# Patient Record
Sex: MALE | Race: WHITE | NOT HISPANIC OR LATINO | Employment: FULL TIME | URBAN - METROPOLITAN AREA
[De-identification: names, ages, dates, MRNs, and addresses within clinical notes are randomized per-mention and may not be internally consistent; named-entity substitution may affect disease eponyms.]

---

## 2025-02-17 ENCOUNTER — APPOINTMENT (OUTPATIENT)
Dept: UROLOGY | Facility: CLINIC | Age: 30
End: 2025-02-17
Payer: COMMERCIAL

## 2025-02-28 ENCOUNTER — TELEPHONE (OUTPATIENT)
Dept: UROLOGY | Facility: CLINIC | Age: 30
End: 2025-02-28
Payer: COMMERCIAL

## 2025-02-28 NOTE — TELEPHONE ENCOUNTER
"Pre-Consult Phone Call    Verified patient by name and date of birth.    Confirmed consult needed for urological reconstruction.    Medicaid/Medicare? no    Sterilization Complete? no    Patient interested in Vaginoplasty     Information provided Letters of support and Surgical procedure brochure     HRT >1 year 2020     Nicotine Never    DM2 Never     Height 5' 8\"    Weight approx 200 lbs    BMI approx 30    Hair Removal None    Ensured patient is aware that letters of support are valid for one year and that we cannot schedule their surgery until we have received copies of both letters.    Pt has one letter. Will ask for another provider. Offered to schedule with [facility].    Encouraged patient to become familiar with their insurance policy, coverage and financial responsibility.    Addressed patient's questions.    Encouraged patient to contact the team with any other questions or concerns.  "

## 2025-03-03 ENCOUNTER — APPOINTMENT (OUTPATIENT)
Dept: UROLOGY | Facility: CLINIC | Age: 30
End: 2025-03-03
Payer: COMMERCIAL

## 2025-03-10 ENCOUNTER — APPOINTMENT (OUTPATIENT)
Dept: UROLOGY | Facility: CLINIC | Age: 30
End: 2025-03-10
Payer: COMMERCIAL

## 2025-03-10 VITALS — SYSTOLIC BLOOD PRESSURE: 118 MMHG | DIASTOLIC BLOOD PRESSURE: 80 MMHG | WEIGHT: 207 LBS

## 2025-03-10 DIAGNOSIS — F64.9 GENDER DYSPHORIA: Primary | ICD-10-CM

## 2025-03-10 PROCEDURE — 99203 OFFICE O/P NEW LOW 30 MIN: CPT | Performed by: UROLOGY

## 2025-03-10 RX ORDER — SYRINGE, DISPOSABLE, 1 ML
SYRINGE, EMPTY DISPOSABLE MISCELLANEOUS
COMMUNITY
Start: 2025-02-28

## 2025-03-10 RX ORDER — ESTRADIOL VALERATE 40 MG/ML
INJECTION INTRAMUSCULAR
COMMUNITY

## 2025-03-10 RX ORDER — NEEDLES, SAFETY 23GX1"
NEEDLE, DISPOSABLE MISCELLANEOUS
COMMUNITY
Start: 2025-02-28

## 2025-03-10 RX ORDER — NEEDLES, DISPOSABLE 25GX5/8"
NEEDLE, DISPOSABLE MISCELLANEOUS
COMMUNITY
Start: 2024-12-16

## 2025-03-10 RX ORDER — PROGESTERONE 200 MG/1
200 CAPSULE ORAL DAILY
COMMUNITY

## 2025-03-10 RX ORDER — NEEDLES, DISPOSABLE 18GX1 1/2"
NEEDLE, DISPOSABLE MISCELLANEOUS
COMMUNITY
Start: 2025-02-28

## 2025-03-10 NOTE — PROGRESS NOTES
GENDER CARE INITIAL EVALUATION    PROBLEM LIST:  1. Gender dysphoria     HISTORY OF PRESENT ILLNESS:  MING PADILLA Hughes is a 29 y.o. trans woman who is seen for discussion of gender-affirming vaginoplasty.  Pronouns are she/her.  Occupation:  at [employer]  Age of earliest memory of gender incongruence: 6-7  Age of onset of dysphoria: 20s  Body part that causes the most dysphoria: penis  Socially transitioned: 2019  Started hormones: 2020    Sexually attracted to Polysexual  Currently partnered: Yes non-binary person with vagina  Children: No   Interested in fertility preservation: No  Goals of surgery: reducing/eliminating dysphoria, sexual function in congruent gender, and penetrative intercourse  Strong social/family support    Mental health issues: None; SI in 2026  Substance use:  Former smoker  Medical issues:  None  Hair removal: Yes Laser x face, has had consult for electrolysis for perineum    PAST MEDICAL HISTORY:  No past medical history on file.    PAST SURGICAL HISTORY:  No past surgical history on file.     ALLERGIES:   No Known Allergies     MEDICATIONS:   [unfilled]     SOCIAL HISTORY:  Patient     Social History     Socioeconomic History    Marital status: Single     Spouse name: Not on file    Number of children: Not on file    Years of education: Not on file    Highest education level: Not on file   Occupational History    Not on file   Tobacco Use    Smoking status: Not on file    Smokeless tobacco: Not on file   Substance and Sexual Activity    Alcohol use: Not on file    Drug use: Not on file    Sexual activity: Not on file   Other Topics Concern    Not on file   Social History Narrative    Not on file     Social Drivers of Health     Financial Resource Strain: Not on File (2/13/2020)    Received from [facility]    Financial Resource Strain     Financial Resource Strain: 0   Food Insecurity: Not on File (9/26/2024)    Received from [facility]    Food Insecurity     Food: 0  "  Transportation Needs: Not on File (2020)    Received from [facility]    Transportation Needs     Transportation: 0   Physical Activity: Not on File (2020)    Received from [facility]    Physical Activity     Physical Activity: 0   Stress: Not on File (2020)    Received from [facility]    Stress     Stress: 0   Social Connections: Not on File (2024)    Received from [facility]    Social Connections     Connectedness: 0   Intimate Partner Violence: Not on file   Housing Stability: Not on File (2020)    Received from [facility]    Housing Stability     Housin       FAMILY HISTORY:  No family history on file.    REVIEW OF SYSTEMS:  Negative except as above    PHYSICAL EXAM:  Visit Vitals  /80     Constitutional: Well-developed. No acute distress.    Head: Atraumatic. Mucus membranes moist.  Neck: Normal range of motion.    Pulmonary/Chest: Effort normal. No respiratory distress.   Abdominal: Nondistended.  Integumentary: No rash or lesions.  Musculoskeletal: Normal range of motion.    Neurological: Alert and oriented to person, place, and time.  Psychiatric: Normal mood and affect. Behavior is normal. Thought content normal.      RADIOLOGY REVIEW:  [unfilled]    LABORATORY REVIEW:   [unfilled]    No results found for: \"GLU\", \"BUN\", \"CREATININE\", \"EGFR\", \"NA\", \"K\", \"CL\", \"CO2\", \"OSMOLALITY\", \"CALCIUM\"   No results found for: \"WBC\", \"RBC\", \"HGB\", \"HCT\", \"MCV\", \"MCH\", \"MCHC\", \"RDW\", \"PLT\", \"MPV\"        Assessment:    No diagnosis found.    MING Hughes is a 29 y.o. TGD: trans woman interested in gender-affirming PPT vaginoplasty.  Social support: Yes   Smoker: No   Diabetic: No No results found for: \"HGBA1C\"  There is no height or weight on file to calculate BMI.     Plan:   Action Items:   Patient to sent in second letter, then choose date. Would like to have this done ASAP   Her main question was about time off work - I informed her we recommend 8 weeks   We will see her again 6 weeks prior to " her surgery date.     Extensive discussion of feminizing surgical options as below  Need letters of support from mental health providers, information provided  Once letters have been received and vetted, will reach out to schedule surgery  Reviewed surgical pathway and provided educational materials & link to patient education site  Encouraged to call in interim with questions, concerns      We discussed the WPATH criteria for bottom surgery  We discussed the various forms of feminizing bottom surgery:  Simple orchiectomy  Vulvoplasty, or zero-depth vaginoplasty  Vaginoplasty, including penile inversion and peritoneal pull-through    The following details were reviewed:  Sub-components of vulvoplasty/vaginoplasty including penectomy, clitoroplasty, urethroplasty, and labiaplasty  Potential complications including infection, bleeding, wound dehiscence, rectal injury, urethral/meatal stenosis, clitoral necrosis, and sub-optimal aesthetic outcome  Areas of hair removal usually required for vaginoplasty  Need for lifelong dilation as well as douching  Complications of vaginal stenosis are not limited to inability to have intercourse   If adequate vaginal dilation is not maintained pelvic abscesses, pyocolpos, or chronic pain may develop     We also discussed the sequencing of operations; some individuals awaiting surgery may opt for orchiectomy to mitigate dysphoria and eliminate the need for spironolactone  Finally, we discussed operative details as well as postoperative course

## 2025-03-20 ENCOUNTER — TELEPHONE (OUTPATIENT)
Dept: UROLOGY | Facility: CLINIC | Age: 30
End: 2025-03-20
Payer: COMMERCIAL

## 2025-03-20 NOTE — TELEPHONE ENCOUNTER
Patient jamia w/ [employer] [place] [employer]:01/01/2024 No svc line for provider, auth request must be submitted thru [facility], per automation system